# Patient Record
Sex: MALE | Race: WHITE
[De-identification: names, ages, dates, MRNs, and addresses within clinical notes are randomized per-mention and may not be internally consistent; named-entity substitution may affect disease eponyms.]

---

## 2019-04-10 ENCOUNTER — HOSPITAL ENCOUNTER (INPATIENT)
Dept: HOSPITAL 39 - MS | Age: 39
LOS: 3 days | Discharge: HOME | DRG: 603 | End: 2019-04-13
Attending: NURSE PRACTITIONER | Admitting: NURSE PRACTITIONER
Payer: COMMERCIAL

## 2019-04-10 DIAGNOSIS — I10: ICD-10-CM

## 2019-04-10 DIAGNOSIS — L02.11: Primary | ICD-10-CM

## 2019-04-10 DIAGNOSIS — K11.20: ICD-10-CM

## 2019-04-10 DIAGNOSIS — L03.221: ICD-10-CM

## 2019-04-10 PROCEDURE — BW2F1ZZ COMPUTERIZED TOMOGRAPHY (CT SCAN) OF NECK USING LOW OSMOLAR CONTRAST: ICD-10-PCS

## 2019-04-10 RX ADMIN — AMPICILLIN SODIUM AND SULBACTAM SODIUM SCH MLS/HR: 2; 1 INJECTION, POWDER, FOR SOLUTION INTRAMUSCULAR; INTRAVENOUS at 18:07

## 2019-04-10 RX ADMIN — VALSARTAN SCH MG: 80 TABLET, FILM COATED ORAL at 20:41

## 2019-04-10 RX ADMIN — VANCOMYCIN HYDROCHLORIDE SCH MLS/HR: 500 INJECTION, POWDER, LYOPHILIZED, FOR SOLUTION INTRAVENOUS at 20:02

## 2019-04-10 RX ADMIN — AMPICILLIN SODIUM AND SULBACTAM SODIUM SCH MLS/HR: 2; 1 INJECTION, POWDER, FOR SOLUTION INTRAMUSCULAR; INTRAVENOUS at 23:22

## 2019-04-10 NOTE — CT
EXAM DESCRIPTION:



 Soft Tissue Neck w/Contrast



CLINICAL HISTORY: 



38 years  Male  abcess to neck parapharyngeal vs retopharyngeal 



COMPARISON: 



None



TECHNIQUE: 



Images were obtained in axial, sagittal, and coronal planes.

Intravenous contrast was administered.



This exam was performed according to our departmental

dose-optimization program which includes use of Automated

Exposure Control, adjustment of the mA and/or kV according to

patient size and/or use of iterative reconstruction technique. 



FINDINGS: 



Abnormal increased attenuation involving subcutaneous fat

adjacent to mid and posterior right parotid gland as well as

right occipital musculature ill-defined fluid is seen within the

subcutaneous fat no definite enhancing fluid collection to

indicate abscess. Fatty replaced parotid glands bilaterally.

Right parotid gland appears enlarged as compared to the left.

Mildly enlarged increased number left submental, posterior

triangle, and occipital lymph nodes.



Prevertebral soft tissues appear normal.



No abnormality carotid arteries or jugular veins bilaterally.

Unremarkable submandibular glands bilaterally.



Mucosal thickening maxillary antra bilaterally. Unremarkable

parapharyngeal soft tissues. No enhancing fluid collections seen.



No laryngeal abnormality.



No abnormality lung apices bilaterally.



No acute osseous abnormality.



IMPRESSION: 



Edema and cellulitis with suspected phlegmon right posterior

cervical soft tissues extending anteriorly to level of pinna and

right parotid gland. Right parotitisis seen possibly reactive.



Right cervical adenopathy.



No evidence for parapharyngeal or retropharyngeal abscess.



Electronically signed by:  Maria L Meyers MD  4/10/2019 7:17 PM CDT

Workstation: [id number]

## 2019-04-10 NOTE — HP
SUPERVISING PHYSICIAN:  Micah Cuevas MD



CHIEF COMPLAINT:  Abscess, cellulitis, right ear.



HISTORY OF PRESENT ILLNESS:  Mr. Cuevas is a 38-year-old,  male patient
of Dr. Tong.  This past Monday, he was seen in the clinic for a small area 
behind the ear of cellulitis and some lymphadenopathy.  His wife noted the 
lesion first appeared four days prior to being seen in the clinic.  She had 
actually tried to pop it, but only got some blood at that point.  He was seen in
the clinic and started on some Bactrim.  He also had some uncontrolled 
hypertension.  He had previously been on some medication, but had not seen Dr. Tong since 2012.  His actual blood pressure in the clinic was 169/103.  He did 
note he had been running a little fever and had some rigors early this morning 
and the area behind his ear had significantly increased in pain and size.  Dr. Tong did see the patient and noted that from Monday to today, the area had 
increased 10-fold, but there were no areas of obvious consolidations or 
fluctuations to do incision and drainage.  There was significant involvement of 
cervical lymph nodes.  He was directly admitted to the Medical/Surgical Floor 
for ongoing therapy with parenteral antibiotic and concerns for worsening 
cellulitis and early sepsis.



PAST MEDICAL HISTORY:  

1.  Hypertension.



PAST SURGICAL HISTORY:  

1.  Left inguinal hernia repair.



MEDICATIONS:  No chronic home medications.



ALLERGIES:  NO KNOWN DRUG ALLERGIES.



FAMILY HISTORY:  Father has a history of type 2 diabetes.  Mother has history of
hypertension.  His sister has cervical cancer.



SOCIAL HISTORY:  The patient is , lives in [place] and has 3 children.  He
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  The patient has never 
smoked.  He does not drink alcohol.  



REVIEW OF SYSTEMS:  

CONSTITUTIONAL:  Negative for any fatigue, but noticed subjective fever and 
rigors today.  No unintentional weight loss.  

HEENT:  As noted in history of present illness, large abscess behind the right 
ear.   Negative for sore throats, earaches, nasal congestion.

RESPIRATORY:  Negative for coughing, wheezing or shortness of breath.

CARDIOVASCULAR:  Negative for chest pain, palpitations or syncopal episodes.  
Positive for uncontrolled hypertension.

GASTROINTESTINAL:  Negative for nausea, vomiting, diarrhea, constipation or 
abdominal pain.  

GENITOURINARY:  Negative for dysuria, hematuria, polyuria.  

MUSCULOSKELETAL:  No complaints.

NEUROLOGIC:  Negative for seizures, ataxia, dizziness, headaches.



PHYSICAL EXAMINATION: 



VITAL SIGNS: In the clinic, he had blood pressure 169/103.  Temperature 98.  
Pulse 102. Oxygen saturation 98% on room air at rest.  Admission weight 92.5 kg.
 



GENERAL:  The patient is resting comfortably and appears to be in no acute 
distress.  He is alert.



HEENT: Tympanic membranes clear bilaterally.  Oropharynx is pink, moist without 
any lesions.  



NECK:  Behind the right ear is an approximately 10 x 10 raised erythematous area
that is hard to palpation, but warm, no obvious areas of consolidation or 
fluctuation.  He does have active lymph nodes on the right side.  



RESPIRATORY:  No stridor, no wheezing.  Lungs clear to auscultation.



CARDIOVASCULAR:  Regular rate and rhythm, prone to be tachycardic with a rate of
104.  No murmurs, gallops, or rubs.  



ABDOMEN: Soft, nontender.  Positive bowel sounds.  

 

EXTREMITIES:  There is no cyanosis, clubbing or edema.  



NEUROLOGIC: The patient is alert and oriented times three.  



LABORATORY:  CBC shows white count 7,400, hemoglobin 14.6, hematocrit 43.6, 
platelet count 162,000.  Differential did show an early left shift.  Sedrate 
normal at 11.  Chemistry showed mildly low potassium at 3.4, sodium 135, CO2 20,
BUN 19, creatinine 1.03, glucose 99, lactic acid 0.8, calcium 8.8.  Liver 
functions all within normal limits.  C-reactive protein elevated at 11.5.



MICROBIOLOGY:  Blood cultures pending.



RADIOLOGY:  Soft tissue of the neck CT with contrast per radiologic 
interpretation showed edema and cellulitis with suspected phlegmon right 
posterior cervical soft tissues extending anteriorly to level of pinna and right
parotid gland.  Right parotitis seen possibly reactive, Right cervical 
adenopathy.  No evidence for parapharyngeal or retropharyngeal abscess.



ASSESSMENT:

1.  Right sided neck cellulitis with possible abscess and development of right

     posterior cervical tissue that extends anteriorly to just below the ear 
with some

     possible right parotitis that is reactive with a right cervical adenopathy 
without

     any evidence of any parapharyngeal or retropharyngeal abscess with the 
patient

     failing to respond to outpatient treatment with oral antibiotics.  

2.  Uncontrolled hypertension.



PLAN:  The patient is going to be directly admitted for initiation of parenteral
antibiotics including vancomycin per pharmacy protocol as well as some Unasyn.  
We will possibly need a consult with Dr. Behr in the morning depending on the 
patient's response and will probably will need to get an ultrasound of soft 
tissue to further evaluate area of abscess to see if there is any reason to do 
incision and drainage.  The patient will be on Tylenol and Motrin for any 
fevers.  He will be provided pain management with oral therapy as needed.  He 
will be on DVT prophylaxis per protocol.  I will start him on valsartan 160 mg 
b.i.d. and closely monitor his blood pressures.  We will anticipate his length 
of stay to be at least 2 to 3 days depending on how he does clinically.  Until 
he can transition to outpatient management and oral antibiotics, we will 
continue to monitor and treat as needed.  



#[id number]

[facility]

## 2019-04-11 PROCEDURE — 0H94XZZ DRAINAGE OF NECK SKIN, EXTERNAL APPROACH: ICD-10-PCS | Performed by: SURGERY

## 2019-04-11 RX ADMIN — CHLORHEXIDINE GLUCONATE SCH ML: 213 SOLUTION TOPICAL at 14:02

## 2019-04-11 RX ADMIN — AMPICILLIN SODIUM AND SULBACTAM SODIUM SCH MLS/HR: 2; 1 INJECTION, POWDER, FOR SOLUTION INTRAMUSCULAR; INTRAVENOUS at 23:38

## 2019-04-11 RX ADMIN — AMPICILLIN SODIUM AND SULBACTAM SODIUM SCH MLS/HR: 2; 1 INJECTION, POWDER, FOR SOLUTION INTRAMUSCULAR; INTRAVENOUS at 17:58

## 2019-04-11 RX ADMIN — CHLORHEXIDINE GLUCONATE SCH ML: 213 SOLUTION TOPICAL at 20:42

## 2019-04-11 RX ADMIN — VALSARTAN SCH MG: 80 TABLET, FILM COATED ORAL at 09:06

## 2019-04-11 RX ADMIN — VANCOMYCIN HYDROCHLORIDE SCH MLS/HR: 500 INJECTION, POWDER, LYOPHILIZED, FOR SOLUTION INTRAVENOUS at 20:37

## 2019-04-11 RX ADMIN — AMPICILLIN SODIUM AND SULBACTAM SODIUM SCH MLS/HR: 2; 1 INJECTION, POWDER, FOR SOLUTION INTRAMUSCULAR; INTRAVENOUS at 11:30

## 2019-04-11 RX ADMIN — VALSARTAN SCH MG: 80 TABLET, FILM COATED ORAL at 20:42

## 2019-04-11 RX ADMIN — AMPICILLIN SODIUM AND SULBACTAM SODIUM SCH MLS/HR: 2; 1 INJECTION, POWDER, FOR SOLUTION INTRAMUSCULAR; INTRAVENOUS at 05:33

## 2019-04-11 RX ADMIN — VANCOMYCIN HYDROCHLORIDE SCH MLS/HR: 500 INJECTION, POWDER, LYOPHILIZED, FOR SOLUTION INTRAVENOUS at 08:55

## 2019-04-11 NOTE — CONS
DATE OF CONSULTATION:  04/11/19



HISTORY OF PRESENT ILLNESS:  The patient is a 38-year-old male patient of Dr. Tong who was being treated in the clinic with oral Bactrim for lymphadenopathy 
behind the right ear.  There has been no significant fever, chills, lethargy.  
He was noted to have a normal white count in the clinic, but he had developed 
redness surrounding the wound.  It did not drain despite the fact that the wife 
tried once with a needle.  



PAST MEDICAL HISTORY:  

1.  Hypertension.



PAST SURGICAL HISTORY:  

1.  Herniorrhaphy.



MEDICATIONS:  He takes no medications routinely, but was on Bactrim pre-
hospitalization.



ALLERGIES:  NO KNOWN DRUG ALLERGIES.



FAMILY HISTORY:  Diabetes, hypertension, history of sister with cervical cancer.



SOCIAL HISTORY:  The patient is  and lives in [place].  He does not smoke,
does not drink alcohol.  He has three children.  



REVIEW OF SYSTEMS:  He denies shortness of breath, chest pain.  Denies wheezing,
cough or drainage.  Denies problems with urination.  Denies nausea and vomiting.
 Denies change in his bowel habits or weight loss.  



PHYSICAL EXAMINATION:



GENERAL:  The patient is awake, alert, cooperative, in mild distress.



VITAL SIGNS:  The patient is currently afebrile, normotensive.



HEENT:  Sclerae nonicteric.  Mucous membranes moist.  On the right side behind 
the ear in the hairline is an area of induration, erythema with a central eschar
and minimal fluctuance and tenderness.  It is also warm.



CHEST:  Equal breath sounds.



ABDOMEN:  Benign.



LABORATORY:  White count 11.4, normal hemoglobin.  There was a left shift.  
Sedrate normal at 11.  Liver functions within normal limits.  C-reactive protein
mildly elevated at 11.5.  Potassium 3.4, creatinine 1.03.  CT of the neck and 
ultrasound revealed no obvious abscess, but did see cervical adenopathy.



ASSESSMENT:

1.  Cellulitis of the right posterior neck secondary to infected cyst versus 
some sort

     of bite.  



PLAN:  The patient has been placed on vancomycin and Unasyn.  I will obtain a 
culture.  The risks, benefits and alternatives to incision and drainage at 
bedside were discussed with the patient.  He understands and agrees to the plan.



#[id number]

[facility]

## 2019-04-11 NOTE — OP
DATE OF PROCEDURE:  04/11/19



PREOPERATIVE DIAGNOSIS: 

1.   Abscess right neck.



POSTOPERATIVE DIAGNOSIS: 

1.   Abscess right neck.



SURGICAL PROCEDURE: 

1.   Incision and drainage abscess right neck.



SURGEON:  Donald A. Behr, M.D.



ASSISTANT:  None.



ANESTHESIA:  None.



INDICATION FOR SURGERY:  The patient is a 38 year-old male who presented with 
throat cellulitis and then developed an area of fluctuance and a small amount of
drainage in the hairline behind the right ear.  



FINDINGS AT TIME OF PROCEDURE:  A moderate amount of thick purulent/bloody 
drainage was obtained.



DESCRIPTION OF PROCEDURE:  The patient's right post auricular neck was prepped 
with Betadine.  Then using an 18 gauge needle the eschar and a small incision 
was made.  Then a culturette was introduced through the hole and a culture was 
taken.  It was put into a culture medium.  At this point, there was some 
pressure place laterally and a moderate amount of creamy drainage was obtained. 
There was no odor.  A dressing was applied.  Appropriate wound care orders were 
ordered and the culturette was sent.  



#[id number]

[facility]

## 2019-04-11 NOTE — PN
SUPERVISING PHYSICIAN:  Micah Cuevas MD



DATE:  04/11/19



SUBJECTIVE:  The patient is walking around in his hospital room.  He has just 
seen Dr. Behr who removed quite a bit of pus from his area of cellulitis on his 
right posterior neck.  He has no complaints of nausea, vomiting, diarrhea, 
constipation, shortness of breath or chest pain.  



OBJECTIVE:  

VITAL SIGNS:  Temperature 98.6.  Heart rate 75.  Blood pressure 143/86.  
Respiratory rate 16.  O2 saturation 97% on room air.  

RESPIRATORY:  Essentially clear to auscultation bilaterally.  

CARDIAC: Regular rate and rhythm.  

GASTROINTESTINAL: Abdomen is soft, nondistended, nontender.  Bowel sounds are 
positive.  

INTEGUMENT:  He has an area of erythema and mild edema to his right posterior 
neck that extends slightly to the right lateral neck.  The area of erythema is 
about 8 to 9 cm.  There is a small amount of drainage from the area that was 
opened.  Otherwise, no fluctuance seen.

NEUROLOGIC: Awake, alert and oriented times three.  



LABORATORY:  There are no labs or films to report.  His preliminary blood 
cultures show negative to date.  His soft tissue ultrasound shows a 1.4 cm 
subcutaneous nodule with edema in the soft tissue right neck below the occipital
region, nonvascular.  No cyst or abscess formation.  No dominant solid mass, 
most likely focal cellulitis.  All other labs and films have been reviewed via 
the EMR.



ASSESSMENT: 

1.  Right sided neck cellulitis with a 1.4 cm subcutaneous nodule that was 
drained by

     Dr. Behr.

2.  Uncontrolled hypertension.



PLAN:  We will continue present supportive care.  Dr. Behr has given orders for 
Hibiclens bath twice daily as well as warm compresses to that area.  We will 
continue with his present antibiotic therapy.  Dr. Behr did culture the area and
we will monitor those as they become available.  I have ordered routine lab for 
in the morning.  We will continue to monitor the patient closely and follow as 
needed.  Dr. Cuevas is the collaborating physician and available for 
consultation.  



#[id number]

[facility]

## 2019-04-11 NOTE — US
EXAM DESCRIPTION: 

Soft Tissue,Head/Neck:  ULTRASOUND.



CLINICAL HISTORY: 

38 years Male cellulitis



COMPARISON: 

None Available.



TECHNIQUE: 

Transcutaneous scanning: Gray-scale and Doppler modes.



FINDINGS: 

Scanning of the right neck abutting the occipital region of the

skull. Adipose tissue edema is noted. A somewhat masslike area

measures 1.0 x 1.1 x 1.4 cm. No abscess or large fluid

collection. Posterior acoustic enhancement. Nonvascular. No

dominant solid mass or distinct cyst. No large calcifications.



IMPRESSION: 1.4 cm subcutaneous nodule, with edema in the soft

tissue right neck below the occipital region. Nonvascular. No

cyst or abscess formation. No dominant solid mass. Most likely

focal cellulitis.



Electronically signed by:  Mario Hilliard MD  4/11/2019 10:17 AM

CDT Workstation: [id number]

## 2019-04-12 RX ADMIN — CHLORHEXIDINE GLUCONATE SCH ML: 213 SOLUTION TOPICAL at 20:42

## 2019-04-12 RX ADMIN — CHLORHEXIDINE GLUCONATE SCH ML: 213 SOLUTION TOPICAL at 08:57

## 2019-04-12 RX ADMIN — AMPICILLIN SODIUM AND SULBACTAM SODIUM SCH MLS/HR: 2; 1 INJECTION, POWDER, FOR SOLUTION INTRAMUSCULAR; INTRAVENOUS at 05:47

## 2019-04-12 RX ADMIN — VALSARTAN SCH MG: 80 TABLET, FILM COATED ORAL at 09:11

## 2019-04-12 RX ADMIN — AMPICILLIN SODIUM AND SULBACTAM SODIUM SCH MLS/HR: 1; .5 INJECTION, POWDER, FOR SOLUTION INTRAMUSCULAR; INTRAVENOUS at 19:01

## 2019-04-12 RX ADMIN — VALSARTAN SCH MG: 80 TABLET, FILM COATED ORAL at 20:41

## 2019-04-12 RX ADMIN — AMPICILLIN SODIUM AND SULBACTAM SODIUM SCH: 2; 1 INJECTION, POWDER, FOR SOLUTION INTRAMUSCULAR; INTRAVENOUS at 11:30

## 2019-04-12 RX ADMIN — AMPICILLIN SODIUM AND SULBACTAM SODIUM SCH MLS/HR: 1; .5 INJECTION, POWDER, FOR SOLUTION INTRAMUSCULAR; INTRAVENOUS at 12:25

## 2019-04-12 RX ADMIN — VANCOMYCIN HYDROCHLORIDE SCH MLS/HR: 500 INJECTION, POWDER, LYOPHILIZED, FOR SOLUTION INTRAVENOUS at 17:08

## 2019-04-12 RX ADMIN — VANCOMYCIN HYDROCHLORIDE SCH: 500 INJECTION, POWDER, LYOPHILIZED, FOR SOLUTION INTRAVENOUS at 08:54

## 2019-04-12 RX ADMIN — VANCOMYCIN HYDROCHLORIDE SCH MLS/HR: 500 INJECTION, POWDER, LYOPHILIZED, FOR SOLUTION INTRAVENOUS at 08:08

## 2019-04-13 VITALS — TEMPERATURE: 97.7 F

## 2019-04-13 VITALS — SYSTOLIC BLOOD PRESSURE: 156 MMHG | DIASTOLIC BLOOD PRESSURE: 97 MMHG | OXYGEN SATURATION: 98 %

## 2019-04-13 RX ADMIN — VANCOMYCIN HYDROCHLORIDE SCH MLS/HR: 500 INJECTION, POWDER, LYOPHILIZED, FOR SOLUTION INTRAVENOUS at 02:05

## 2019-04-13 RX ADMIN — VALSARTAN SCH MG: 80 TABLET, FILM COATED ORAL at 08:50

## 2019-04-13 RX ADMIN — VANCOMYCIN HYDROCHLORIDE SCH MLS/HR: 500 INJECTION, POWDER, LYOPHILIZED, FOR SOLUTION INTRAVENOUS at 08:50

## 2019-04-13 RX ADMIN — ALUMINUM ZIRCONIUM TRICHLOROHYDREX GLY SCH MG: 0.2 STICK TOPICAL at 01:05

## 2019-04-13 RX ADMIN — AMPICILLIN SODIUM AND SULBACTAM SODIUM SCH MLS/HR: 1; .5 INJECTION, POWDER, FOR SOLUTION INTRAMUSCULAR; INTRAVENOUS at 01:01

## 2019-04-13 RX ADMIN — ALUMINUM ZIRCONIUM TRICHLOROHYDREX GLY SCH MG: 0.2 STICK TOPICAL at 08:49

## 2019-04-13 RX ADMIN — AMPICILLIN SODIUM AND SULBACTAM SODIUM SCH MLS/HR: 1; .5 INJECTION, POWDER, FOR SOLUTION INTRAMUSCULAR; INTRAVENOUS at 06:47

## 2019-04-13 RX ADMIN — CHLORHEXIDINE GLUCONATE SCH ML: 213 SOLUTION TOPICAL at 09:13

## 2019-04-13 NOTE — PN
SUPERVISING PHYSICIAN:  Micah Cuevas MD



DATE:  04/12/19



SUBJECTIVE:  The patient is walking around in his room.  He feels much better 
than yesterday.  He feels that the swelling has gone down.  He has a large 
dressing on the right side of his neck.  He denies any nausea, vomiting, 
diarrhea, constipation.



OBJECTIVE:  

VITAL SIGNS:  Temperature 98.4.  Heart rate 82.  Blood pressure 130/78.  
Respiratory rate 16.  O2 saturation 98% on room air.  

RESPIRATORY:  Essentially clear to auscultation bilaterally.  

CARDIAC: Regular rate and rhythm.  

GASTROINTESTINAL: Abdomen is soft, nondistended, nontender.  Bowel sounds are 
positive.  

INTEGUMENT:  The patient has a dressing to the right side of his neck and it is 
dry and intact.  The erythema is much less than yesterday.  There is no warmth 
in the area.  The edema is very minimal.  

NEUROLOGIC: Awake, alert and oriented times three.  



LABORATORY:  CBC is basically unremarkable as well as his chemistry.  Blood 
cultures show no growth after 48 hours.  His initial blood cultures show gram 
positive cocci in pairs and clusters and shows a light growth of staph aureus.  
All other labs and films have been reviewed via the EMR.



ASSESSMENT: 

1.  Right sided neck cellulitis with a 1.4 cm subcutaneous nodule that was 
drained by

     Dr. Behr, continuing to improve on antibiotic therapy.

2.  Uncontrolled hypertension.



PLAN:  We will continue present supportive care.  He will continue with his 
Hibiclens baths twice daily and warm compresses twice a day as instructed by Dr.
Behr.  I spoke with Dr. Tong today and he would like him to get his IV 
antibiotics at least for another couple of days.  The patient would like to go 
home tomorrow, but we will discuss that in the morning.  Otherwise, we will plan
to send him home at the latest on Sunday with some doxycycline.  He has Bactrim.
 He will have close followup with Dr. Tong next week to followup his wound 
cultures.  His blood pressure is fairly well controlled on his present dose of 
valsartan.  We will continue to monitor that prior to his discharge and follow 
as needed.  



#[id number]

[facility]

## 2019-04-14 NOTE — DS
SUPERVISING PHYSICIAN:  Micah Cuevas M.D.



DISCHARGE DIAGNOSIS: 

1.   Right sided neck cellulitis with a 1.4 cm subcutaneous nodule that was 
drained by

      Dr. Behr that continues to improve on antibiotic therapy.

2.   Uncontrolled hypertension.



HISTORY OF PRESENT ILLNESS:  This is a 38-year-old male patient who was seen in 
Dr. Tong' office on the date of admission.  He had been  seen the prior Monday 
for a small area behind the ear that appeared to be an insect bite.  He also had
some lymphadenopathy.  The lesion appeared four days prior to his initial visit 
with Dr. Tong.  On the date of admission, Dr. Tong saw him in the office and 
his blood pressure was elevated at 169/103.  He had some fever as well as rigors
that morning.  The area of swelling and erythema on that right side of the neck 
was greatly increased.  Per Dr. Tong, it had increased about ten fold.  There 
were no areas of obvious consolidation or fluctuation to do an incision and 
drainage at that time.  His cervical lymph nodes were significantly enlarged and
he was directly admitted to the hospital for antibiotic therapy as well as 
possible incision and drainage, and concerns for worsening cellulitis and early 
sepsis.



HOSPITAL COURSE:  The patient was admitted to the hospital.  Blood cultures were
drawn.  He was started on some parenteral antibiotics including vancomycin as 
well as Unasyn.  Dr. Behr, general surgeon, was consulted.  He was also started 
on Valsartan 160 mg b.i.d.  Blood pressures were to be monitored.  He was 
continued on his antibiotic therapy and the area slightly worsened.  Dr. Behr 
saw the patient and did an incision and drainage on it.  He removed a moderate 
amount of thick purulent, bloody drainage from the area.  He was given 
instructions to do Hibiclens baths b.i.d. as well as warm compresses b.i.d.  His
blood pressure stabilized.  The area of induration and erythema as well as edema
diminished greatly,.  At this point, he has 2 to 3 cm area of erythema and edema
to his posterior right neck.  There continues to be a moderate amount of 
drainage.  He will be sent home on an additional prescription of doxycycline as 
he has Bactrim at home.  He will also be sent on his blood pressure medicine.  



LABORATORY:  Initial CBC was basically within normal limits.  Yesterday, his 
white count was 6.2 with hemoglobin 13.3 and hematocrit 39.6.  He did have an 
ESR that was normal at 11.  Metabolic panel on admission was fairly unremarkable
and his BNP yesterday was unremarkable as well.  He did have an elevated CRP of 
11.5.  Sensitivity report came back on his wound culture today.  It was 
sensitive to vancomycin as well as Bactrim and doxycycline.  



DISCHARGE PLAN:   He will be discharged in good condition.  He is to resume his 
previous activity and diet.  He has a followup appointment with Dr. Tong on 
04/16/19 at 3:00 PM.  He is to resume his previous antibiotics of Bactrim as 
prescribed by Dr. Tong.  Will also send him home on 10 days of doxycycline.  He
is also to start on the blood pressure medication of Valsartan 160 mg b.i.d.  He
will continue his Hibiclens baths twice a day as well as warm compresses twice a
day, and all other instructions given to him by Dr. Behr.  He is to return to 
the hospital or followup with Dr. Tong for any problems or complications.



DISCHARGE MEDICATIONS:

1.   Bactrim.

2.   Doxycycline.

3.   Valsartan.



#[id number]

[facility]

## 2019-12-23 ENCOUNTER — HOSPITAL ENCOUNTER (OUTPATIENT)
Dept: HOSPITAL 39 - GMAJ | Age: 39
End: 2019-12-23
Attending: FAMILY MEDICINE
Payer: COMMERCIAL

## 2019-12-23 DIAGNOSIS — E55.9: Primary | ICD-10-CM

## 2019-12-23 DIAGNOSIS — I10: ICD-10-CM

## 2021-01-15 ENCOUNTER — HOSPITAL ENCOUNTER (OUTPATIENT)
Dept: HOSPITAL 39 - LAB.O | Age: 41
End: 2021-01-15
Attending: FAMILY MEDICINE
Payer: COMMERCIAL

## 2021-01-15 DIAGNOSIS — Z98.890: ICD-10-CM

## 2021-01-15 DIAGNOSIS — Z30.8: Primary | ICD-10-CM
